# Patient Record
Sex: FEMALE | Race: WHITE | NOT HISPANIC OR LATINO | Employment: UNEMPLOYED | URBAN - METROPOLITAN AREA
[De-identification: names, ages, dates, MRNs, and addresses within clinical notes are randomized per-mention and may not be internally consistent; named-entity substitution may affect disease eponyms.]

---

## 2017-05-05 ENCOUNTER — OFFICE VISIT (OUTPATIENT)
Dept: MEDICAL GROUP | Facility: MEDICAL CENTER | Age: 47
End: 2017-05-05
Attending: NURSE PRACTITIONER
Payer: MEDICAID

## 2017-05-05 VITALS
RESPIRATION RATE: 16 BRPM | WEIGHT: 128 LBS | SYSTOLIC BLOOD PRESSURE: 132 MMHG | DIASTOLIC BLOOD PRESSURE: 70 MMHG | HEART RATE: 84 BPM | BODY MASS INDEX: 21.33 KG/M2 | OXYGEN SATURATION: 98 % | HEIGHT: 65 IN | TEMPERATURE: 97.3 F

## 2017-05-05 DIAGNOSIS — G43.709 CHRONIC MIGRAINE WITHOUT AURA WITHOUT STATUS MIGRAINOSUS, NOT INTRACTABLE: ICD-10-CM

## 2017-05-05 PROBLEM — G43.909 MIGRAINE HEADACHE: Status: ACTIVE | Noted: 2017-05-05

## 2017-05-05 PROCEDURE — 99214 OFFICE O/P EST MOD 30 MIN: CPT | Performed by: INTERNAL MEDICINE

## 2017-05-05 PROCEDURE — 99212 OFFICE O/P EST SF 10 MIN: CPT | Performed by: INTERNAL MEDICINE

## 2017-05-05 RX ORDER — BUTALBITAL, ASPIRIN, AND CAFFEINE 50; 325; 40 MG/1; MG/1; MG/1
1 CAPSULE ORAL EVERY 6 HOURS PRN
Qty: 90 CAP | Refills: 0 | Status: SHIPPED | OUTPATIENT
Start: 2017-05-05

## 2017-05-05 ASSESSMENT — PAIN SCALES - GENERAL: PAINLEVEL: 6=MODERATE PAIN

## 2017-05-05 NOTE — PROGRESS NOTES
"Subjective:   Mavis Dumont is a 46 y.o. female here today for migraine headaches    Migraine headache  Patient presents today for worsening migraine headache. She last saw her primary, Lokesh Duron, for this one year ago. She reports that last April she moved to [place] to take care of her father who has subsequently passed away and came back to [place] in December. She has been on Fiorinal chronically for several years and reports taking one tablet 4 times a day when she was in [place].  She reports severe nausea with vomiting, photophobia with headaches.  She complains of daily low-grade headaches and severe headaches that 10 times per month. She has tried Imitrex in the past but states that this makes her too sleepy, although it is effective. She has never been on a daily prophylactic medication, but does plan to move to [place] in a few weeks so would not be able to follow up. She reports recently that she has increased her use of over-the-counter headache medications and migraine medications and is taking something almost daily.       Current medicines (including changes today)  Current Outpatient Prescriptions   Medication Sig Dispense Refill   • butalbital-asa-caffeine (FIORINAL) -40 MG Cap capsule Take 1 Cap by mouth every 6 hours as needed. 90 Cap 0     No current facility-administered medications for this visit.     She  has a past medical history of Back pain; Depression; Anxiety; and Headache.    ROS   As above in HPI     Objective:     Blood pressure 132/70, pulse 84, temperature 36.3 °C (97.3 °F), resp. rate 16, height 1.651 m (5' 5\"), weight 58.06 kg (128 lb), last menstrual period 04/30/2017, SpO2 98 %, not currently breastfeeding. Body mass index is 21.3 kg/(m^2).   Physical Exam:  Constitutional: Alert, no distress.  Skin: Warm, dry, good turgor, no rashes in visible areas.  Eye: Conjunctiva clear, lids normal.  Psych: Alert and oriented x3, normal affect and mood.      Assessment and " Plan:   The following treatment plan was discussed    1. Chronic migraine without aura without status migrainosus, not intractable  Discussed with patient that I would recommend starting her on a daily medication to help reduce frequency and severity of migraines, however will not do this if she will be leaving town the next few weeks. She is requesting a refill of her Fiorinal, which I have provided her today. #90 tablets given so that she will have enough to allow her to reestablish care in [place]. We discussed that she needs to stop taking her daily over-the-counter headache medications as she may have a component of medication overuse headache.  -Fiorinal Q6 hrs PRN migraine headache  -Patient to discontinue over-the-counter headache medications  -Advised patient to follow-up with her primary should she find herself in [place] for more than a few weeks    Followup: Return if symptoms worsen or fail to improve.

## 2017-05-05 NOTE — ASSESSMENT & PLAN NOTE
Patient presents today for worsening migraine headache. She last saw her primary, Lokesh Duron, for this one year ago. She reports that last April she moved to [place] to take care of her father who has subsequently passed away and came back to [place] in December. She has been on Fiorinal chronically for several years and reports taking one tablet 4 times a day when she was in [place].  She reports severe nausea with vomiting, photophobia with headaches.  She complains of daily low-grade headaches and severe headaches that 10 times per month. She has tried Imitrex in the past but states that this makes her too sleepy, although it is effective. She has never been on a daily prophylactic medication, but does plan to move to [place] in a few weeks so would not be able to follow up.

## 2017-05-05 NOTE — MR AVS SNAPSHOT
"        Mavis Dumont   2017 1:50 PM   Office Visit   MRN: [id number]    [facility]   Dept Phone:  [phone]    Description:  Female : 1970   Provider:  Gillian Diallo M.D.           Reason for Visit     Migraine x 2 months      Allergies as of 2017     Allergen Noted Reactions    Hydrocodone 2013       Pcn [Penicillins] 2013       Vicodin [Hydrocodone-Acetaminophen] 2013         You were diagnosed with     Chronic migraine without aura without status migrainosus, not intractable   [045232]         Vital Signs     Blood Pressure Pulse Temperature Respirations Height Weight    132/70 mmHg 84 36.3 °C (97.3 °F) 16 1.651 m (5' 5\") 58.06 kg (128 lb)    Body Mass Index Oxygen Saturation Last Menstrual Period Breastfeeding? Smoking Status       21.30 kg/m2 98% 2017 No Current Every Day Smoker       Basic Information     Date Of Birth Sex Race Ethnicity Preferred Language    1970 Female White Non- English      Problem List              ICD-10-CM Priority Class Noted - Resolved    Back pain M54.9   2010 - Present    Tobacco use Z72.0   2015 - Present    Anxiety F41.9   2015 - Present    Depression F32.9   2015 - Present    Irregular menses N92.6   2015 - Present    Tension headache G44.209   2015 - Present    Insomnia G47.00   2015 - Present    Benzodiazepine (tranquilizer) overdose T42.4X1A   10/7/2015 - Present    Migraine headache G43.909   2017 - Present      Health Maintenance        Date Due Completion Dates    IMM DTaP/Tdap/Td Vaccine (1 - Tdap) 1991 ---    IMM PNEUMOCOCCAL 19-64 (ADULT) MEDIUM RISK SERIES (1 of 1 - PPSV23) 1991 ---    PAP SMEAR 1993 ---    MAMMOGRAM 2012 ---            Current Immunizations     Influenza Vaccine Quad Inj (Preserved) 10/7/2015  8:25 AM    Pneumococcal Vaccine (PCV7) Historical Data 10/7/1995      Below and/or attached are the medications your provider " expects you to take. Review all of your home medications and newly ordered medications with your provider and/or pharmacist. Follow medication instructions as directed by your provider and/or pharmacist. Please keep your medication list with you and share with your provider. Update the information when medications are discontinued, doses are changed, or new medications (including over-the-counter products) are added; and carry medication information at all times in the event of emergency situations     Allergies:  HYDROCODONE - (reactions not documented)     PCN - (reactions not documented)     VICODIN - (reactions not documented)               Medications  Valid as of: May 05, 2017 -  2:20 PM    Generic Name Brand Name Tablet Size Instructions for use    Butalbital-Aspirin-Caffeine (Cap) FIORINAL -40 MG Take 1 Cap by mouth every 6 hours as needed.        .                 Medicines prescribed today were sent to:     [facility] [address]    [address] [place] [id number]    Phone: [phone] Fax: [phone]    Open 24 Hours?: No      Medication refill instructions:       If your prescription bottle indicates you have medication refills left, it is not necessary to call your provider’s office. Please contact your pharmacy and they will refill your medication.    If your prescription bottle indicates you do not have any refills left, you may request refills at any time through one of the following ways: The online [employer] system (except Urgent Care), by calling your provider’s office, or by asking your pharmacy to contact your provider’s office with a refill request. Medication refills are processed only during regular business hours and may not be available until the next business day. Your provider may request additional information or to have a follow-up visit with you prior to refilling your medication.   *Please Note: Medication refills are assigned a new Rx number when  refilled electronically. Your pharmacy may indicate that no refills were authorized even though a new prescription for the same medication is available at the pharmacy. Please request the medicine by name with the pharmacy before contacting your provider for a refill.        Other Notes About Your Plan     10/6/15 Hospital Admit- suicide attempt vs unintentional O.D.  10/2/15 ER Visit for Depression/Suicidal?, Possible OD on Xanax ?, Discharged. Pt needs appt in clinic b4 any refills on meds.  FALL RISK ASSESSMENT COMPLETED 6/25/15             "[employer] Access Code: Activation code not generated  Current MyChart Status: Active          Quit Tobacco Information     Do you want to quit using tobacco?    Quitting tobacco decreases risks of cancer, heart and lung disease, increases life expectancy, improves sense of taste and smell, and increases spending money, among other benefits.    If you are thinking about quitting, we can help.  • [employer] Quit Tobacco Program: 210.926.8257  o Program occurs weekly for four weeks and includes pharmacist consultation on products to support quitting smoking or chewing tobacco. A provider referral is needed for pharmacist consultation.  • Tobacco Users Help Hotline: 3-266-QUIT-NOW (773-5047) or https://[place].quitlogix.org/  o Free, confidential telephone and online coaching for [place] residents. Sessions are designed on a schedule that is convenient for you. Eligible clients receive free nicotine replacement therapy.  • Nationally: www.smokefree.gov  o Information and professional assistance to support both immediate and long-term needs as you become, and remain, a non-smoker. Smokefree.gov allows you to choose the help that best fits your needs.